# Patient Record
Sex: MALE | Race: AMERICAN INDIAN OR ALASKA NATIVE | NOT HISPANIC OR LATINO | Employment: FULL TIME | URBAN - METROPOLITAN AREA
[De-identification: names, ages, dates, MRNs, and addresses within clinical notes are randomized per-mention and may not be internally consistent; named-entity substitution may affect disease eponyms.]

---

## 2021-07-11 ENCOUNTER — HOSPITAL ENCOUNTER (EMERGENCY)
Facility: MEDICAL CENTER | Age: 41
End: 2021-07-11
Attending: EMERGENCY MEDICINE
Payer: COMMERCIAL

## 2021-07-11 VITALS
HEIGHT: 73 IN | TEMPERATURE: 96.6 F | HEART RATE: 90 BPM | WEIGHT: 300 LBS | RESPIRATION RATE: 16 BRPM | BODY MASS INDEX: 39.76 KG/M2 | DIASTOLIC BLOOD PRESSURE: 80 MMHG | SYSTOLIC BLOOD PRESSURE: 166 MMHG | OXYGEN SATURATION: 97 %

## 2021-07-11 DIAGNOSIS — F10.929 ALCOHOLIC INTOXICATION WITH COMPLICATION (HCC): ICD-10-CM

## 2021-07-11 DIAGNOSIS — E16.2 HYPOGLYCEMIA: ICD-10-CM

## 2021-07-11 LAB
ALBUMIN SERPL BCP-MCNC: 3.9 G/DL (ref 3.2–4.9)
ALBUMIN/GLOB SERPL: 1.5 G/DL
ALP SERPL-CCNC: 67 U/L (ref 30–99)
ALT SERPL-CCNC: 21 U/L (ref 2–50)
ANION GAP SERPL CALC-SCNC: 13 MMOL/L (ref 7–16)
AST SERPL-CCNC: 27 U/L (ref 12–45)
BASOPHILS # BLD AUTO: 0.3 % (ref 0–1.8)
BASOPHILS # BLD: 0.01 K/UL (ref 0–0.12)
BILIRUB SERPL-MCNC: 0.5 MG/DL (ref 0.1–1.5)
BUN SERPL-MCNC: 8 MG/DL (ref 8–22)
CALCIUM SERPL-MCNC: 8 MG/DL (ref 8.5–10.5)
CHLORIDE SERPL-SCNC: 104 MMOL/L (ref 96–112)
CO2 SERPL-SCNC: 19 MMOL/L (ref 20–33)
CREAT SERPL-MCNC: 0.38 MG/DL (ref 0.5–1.4)
EOSINOPHIL # BLD AUTO: 0 K/UL (ref 0–0.51)
EOSINOPHIL NFR BLD: 0 % (ref 0–6.9)
ERYTHROCYTE [DISTWIDTH] IN BLOOD BY AUTOMATED COUNT: 40.8 FL (ref 35.9–50)
GLOBULIN SER CALC-MCNC: 2.6 G/DL (ref 1.9–3.5)
GLUCOSE BLD-MCNC: 117 MG/DL (ref 65–99)
GLUCOSE BLD-MCNC: 96 MG/DL (ref 65–99)
GLUCOSE SERPL-MCNC: 92 MG/DL (ref 65–99)
HCT VFR BLD AUTO: 45.6 % (ref 42–52)
HGB BLD-MCNC: 15.9 G/DL (ref 14–18)
IMM GRANULOCYTES # BLD AUTO: 0.01 K/UL (ref 0–0.11)
IMM GRANULOCYTES NFR BLD AUTO: 0.3 % (ref 0–0.9)
LIPASE SERPL-CCNC: 68 U/L (ref 11–82)
LYMPHOCYTES # BLD AUTO: 0.47 K/UL (ref 1–4.8)
LYMPHOCYTES NFR BLD: 12.4 % (ref 22–41)
MCH RBC QN AUTO: 30.6 PG (ref 27–33)
MCHC RBC AUTO-ENTMCNC: 34.9 G/DL (ref 33.7–35.3)
MCV RBC AUTO: 87.9 FL (ref 81.4–97.8)
MONOCYTES # BLD AUTO: 0.2 K/UL (ref 0–0.85)
MONOCYTES NFR BLD AUTO: 5.3 % (ref 0–13.4)
NEUTROPHILS # BLD AUTO: 3.09 K/UL (ref 1.82–7.42)
NEUTROPHILS NFR BLD: 81.7 % (ref 44–72)
NRBC # BLD AUTO: 0 K/UL
NRBC BLD-RTO: 0 /100 WBC
PLATELET # BLD AUTO: 140 K/UL (ref 164–446)
PMV BLD AUTO: 9.7 FL (ref 9–12.9)
POTASSIUM SERPL-SCNC: 3.8 MMOL/L (ref 3.6–5.5)
PROT SERPL-MCNC: 6.5 G/DL (ref 6–8.2)
RBC # BLD AUTO: 5.19 M/UL (ref 4.7–6.1)
SODIUM SERPL-SCNC: 136 MMOL/L (ref 135–145)
WBC # BLD AUTO: 3.8 K/UL (ref 4.8–10.8)

## 2021-07-11 PROCEDURE — 80053 COMPREHEN METABOLIC PANEL: CPT

## 2021-07-11 PROCEDURE — 83690 ASSAY OF LIPASE: CPT

## 2021-07-11 PROCEDURE — 96365 THER/PROPH/DIAG IV INF INIT: CPT

## 2021-07-11 PROCEDURE — 700101 HCHG RX REV CODE 250: Performed by: EMERGENCY MEDICINE

## 2021-07-11 PROCEDURE — 99284 EMERGENCY DEPT VISIT MOD MDM: CPT

## 2021-07-11 PROCEDURE — 82962 GLUCOSE BLOOD TEST: CPT | Mod: 91

## 2021-07-11 PROCEDURE — 85025 COMPLETE CBC W/AUTO DIFF WBC: CPT

## 2021-07-11 RX ADMIN — THIAMINE HYDROCHLORIDE: 100 INJECTION, SOLUTION INTRAMUSCULAR; INTRAVENOUS at 08:40

## 2021-07-11 NOTE — ED TRIAGE NOTES
"Chief Complaint   Patient presents with   • Hypoglycemia     Pt bib EMS this morning after being found down on his bathroom floor, EMS states there is concern for aspiration, pt was in a puddle of his own drool/sweat, 88% on RA. Pt had a heavy night of drinking last night, doesn't remember any events after about 21:00 last night. A&Ox4 now. Pt is a diabetic, states that he took his insulin yesterday. FSBG was 54 on scene, pt was given 250mL of D10 and FSBG now 149. HTN on scene.    Pt states the productive cough he has is normal for him, tobacco smoker.     BP (!) 192/97   Pulse 70   Temp (!) 35.6 °C (96 °F) (Temporal)   Resp 16   Ht 1.854 m (6' 1\")   Wt (!) 136 kg (300 lb)   SpO2 100%   BMI 39.58 kg/m²       "

## 2021-07-11 NOTE — ED NOTES
Pt resting in [facility]. Pt and mother at bedside updated on POC. Call light in reach.

## 2021-07-11 NOTE — ED NOTES
Pt and family member at bedside verbalize understanding of discharge instructions and follow up/prescription . Pt wheeled out to ED lobby in hospital wheelchair with all belongings.

## 2021-07-11 NOTE — ED PROVIDER NOTES
ED Provider Note    Scribed for Remy Castañeda M.D. by Dilip Maier. 7/11/2021  7:46 AM    Primary care provider: Pcp Pt States None  Means of arrival: EMS  History obtained from: Patient  History limited by: None    CHIEF COMPLAINT  Chief Complaint   Patient presents with   • Hypoglycemia     Pt bib EMS this morning after being found down on his bathroom floor, EMS states there is concern for aspiration, pt was in a puddle of his own drool/sweat, 88% on RA. Pt had a heavy night of drinking last night, doesn't remember any events after about 21:00 last night. A&Ox4 now. Pt is a diabetic, states that he took his insulin yesterday. FSBG was 54 on scene, pt was given 250mL of D10 and FSBG now 149. HTN on scene.      HPI  Seven Blas is a 40 y.o. male with a history of diabetes who presents to the Emergency Department via EMS for hypoglycemia onset last night. The patient states he does not remember the events that occurred yesterday after 9PM due to alcohol intoxication last night. He states that he took his insulin yesterday. He denies missing any meals or excessive medication use. The patient denies associated vomiting. The patient has a history of smoking daily.    REVIEW OF SYSTEMS  Pertinent negatives include no vomiting. A  s above, all other systems reviewed and are negative.   See HPI for further details.     PAST MEDICAL HISTORY   has a past medical history of Asthma, Asthma, Diabetes (HCC), Hypertension, Infectious disease, and Type II or unspecified type diabetes mellitus without mention of complication, not stated as uncontrolled.    SURGICAL HISTORY   has a past surgical history that includes incision and drainage orthopedic (Left, 7/8/2015); split thickness skin graft (Left, 7/10/2015); and split thickness skin graft (Left, 7/13/2015).    SOCIAL HISTORY  Social History     Tobacco Use   • Smoking status: Current Every Day Smoker     Packs/day: 0.25     Types: Cigarettes   • Smokeless tobacco: Never  "Used   Vaping Use   • Vaping Use: Never used   Substance Use Topics   • Alcohol use: Yes     Comment: ocas.   • Drug use: No      Social History     Substance and Sexual Activity   Drug Use No       FAMILY HISTORY  History reviewed. No pertinent family history.    CURRENT MEDICATIONS  Home Medications     Reviewed by Janet Guidry R.N. (Registered Nurse) on 07/11/21 at 0733  Med List Status: Not Addressed   Medication Last Dose Status   albuterol (VENTOLIN OR PROVENTIL) 108 (90 BASE) MCG/ACT AERS inhalation aerosol  Active   albuterol 108 (90 BASE) MCG/ACT Aero Soln inhalation aerosol  Active   aspirin 81 MG tablet  Active   atorvastatin (LIPITOR) 40 MG Tab  Active   carvedilol (COREG) 3.125 MG Tab  Active   glucose blood strip  Active   hydrochlorothiazide (HYDRODIURIL) 12.5 MG tablet  Active   insulin 70/30 (HUMULIN,NOVOLIN) (70-30) 100 UNIT/ML Suspension  Active   insulin NPH (HUMULIN,NOVOLIN) 100 UNIT/ML Suspension  Active   Insulin Pen Needle 32G X 4 MM Misc  Active   INSULIN SYRINGE .5CC/29G 29G X 1/2\" 0.5 ML Misc  Active   levetiracetam (KEPPRA) 500 MG Tab  Active   levofloxacin (LEVAQUIN) 750 MG tablet  Active   linezolid (ZYVOX) 600 MG TABS  Active   lisinopril (PRINIVIL) 10 MG Tab  Active   lisinopril (PRINIVIL) 20 MG TABS  Active   NOVOLOG, insulin aspart, (NOVOLOG) 100 UNIT/ML Solution Pen-injector injection  Active   ONETOUCH DELICA LANCETS FINE Misc  Active   oxycodone immediate-release (ROXICODONE) 5 MG TABS  Active   senna-docusate (PERICOLACE OR SENOKOT S) 8.6-50 MG TABS  Active                ALLERGIES  No Known Allergies    PHYSICAL EXAM  VITAL SIGNS: BP (!) 192/97   Pulse 70   Temp (!) 35.6 °C (96 °F) (Temporal)   Resp 16   Ht 1.854 m (6' 1\")   Wt (!) 136 kg (300 lb)   SpO2 100%   BMI 39.58 kg/m²   Constitutional: Well developed, Well nourished, No acute distress, Non-toxic appearance.   HENT: Normocephalic, Atraumatic, Bilateral external ears normal, Oropharynx is clear mucous " membranes are moist. No oral exudates or nasal discharge.   Eyes: Pupils are equal round and reactive, EOMI, Conjunctiva normal, No discharge.   Neck: Normal range of motion, No tenderness, Supple, No stridor. No meningismus.  Lymphatic: No lymphadenopathy noted.   Cardiovascular: Regular rate and rhythm without murmur rub or gallop.  Thorax & Lungs: Clear breath sounds bilaterally without wheezes, rhonchi or rales. There is no chest wall tenderness.   Abdomen: Soft non-tender non-distended. There is no rebound or guarding. No organomegaly is appreciated. Bowel sounds are normal.  Skin: Normal without rash.   Back: No CVA or spinal tenderness.   Extremities: Intact distal pulses, No edema, No tenderness, No cyanosis, No clubbing. Capillary refill 3 seconds.  Musculoskeletal: Good range of motion in all major joints. No tenderness to palpation or major deformities noted.   Neurologic: Alert & oriented x 3, Normal motor function, Normal sensory function, No focal deficits noted. Reflexes are normal.  Psychiatric: Affect normal, Judgment normal, Mood normal. There is no suicidal ideation or patient reported hallucinations.     DIAGNOSTIC STUDIES / PROCEDURES    LABS  Labs Reviewed   CBC WITH DIFFERENTIAL - Abnormal; Notable for the following components:       Result Value    WBC 3.8 (*)     Platelet Count 140 (*)     Neutrophils-Polys 81.70 (*)     Lymphocytes 12.40 (*)     Lymphs (Absolute) 0.47 (*)     All other components within normal limits   COMP METABOLIC PANEL - Abnormal; Notable for the following components:    Co2 19 (*)     Creatinine 0.38 (*)     Calcium 8.0 (*)     All other components within normal limits   LIPASE   ESTIMATED GFR   POCT GLUCOSE DEVICE RESULTS      All labs reviewed by me.    COURSE & MEDICAL DECISION MAKING  Nursing notes, VS, PMSFHx reviewed in chart.    7:46 AM Patient seen and examined at bedside. Ordered for basic lab panel to evaluate. Patient was treated with detox IV 1000mL for his  symptoms.      Laboratory evaluation reveals mild leukopenia, minimal shift, no electrolyte derangements and mildly low bicarb at 19.  No evidence of pancreatitis.    9:52 AM - I reevaluated the patient at bedside who reports feeling improved. I discussed plan for discharge and follow up as outlined below. The patient verbalizes they feel comfortable going home. The patient is stable for discharge at this time and will return for any new or worsening symptoms. Patient verbalizes understanding and support with my plan for discharge.     Patient has had high blood pressure while in the emergency department, felt likely secondary to medical condition. Counseled patient to monitor blood pressure at home and follow up with primary care physician.      HYDRATION: Based on the patient's presentation of alcohol intoxication the patient was given IV fluids. IV Hydration was used because oral hydration was not adequate alone. Upon recheck following hydration, the patient was improved..    DISPOSITION:  Patient will be discharged home in stable condition.  Patient instructed on minimizing alcohol intake in the future to avoid hypoglycemic episodes.    FINAL IMPRESSION  1. Alcoholic intoxication with complication (HCC)    2. Hypoglycemia          Dilip FULTON (Rosendo), am scribing for, and in the presence of, Remy Castañeda M.D..    Electronically signed by: Dilip Maier ([place]), 7/11/2021    Remy FULTON M.D. personally performed the services described in this documentation, as scribed by Dilip Maier in my presence, and it is both accurate and complete.    The note accurately reflects work and decisions made by me.  Remy Castañeda M.D.  7/11/2021  9:54 AM

## 2023-12-04 ENCOUNTER — TELEPHONE (OUTPATIENT)
Dept: HEALTH INFORMATION MANAGEMENT | Facility: OTHER | Age: 43
End: 2023-12-04

## 2024-11-11 ENCOUNTER — OFFICE VISIT (OUTPATIENT)
Dept: CARDIOLOGY | Facility: MEDICAL CENTER | Age: 44
End: 2024-11-11
Attending: INTERNAL MEDICINE
Payer: MEDICAID

## 2024-11-11 VITALS
OXYGEN SATURATION: 99 % | DIASTOLIC BLOOD PRESSURE: 66 MMHG | SYSTOLIC BLOOD PRESSURE: 124 MMHG | RESPIRATION RATE: 17 BRPM | HEART RATE: 86 BPM | BODY MASS INDEX: 39.76 KG/M2 | WEIGHT: 300 LBS | HEIGHT: 73 IN

## 2024-11-11 DIAGNOSIS — I10 HTN (HYPERTENSION), MALIGNANT: ICD-10-CM

## 2024-11-11 DIAGNOSIS — E11.65 TYPE 2 DIABETES MELLITUS WITH HYPERGLYCEMIA, WITH LONG-TERM CURRENT USE OF INSULIN (HCC): ICD-10-CM

## 2024-11-11 DIAGNOSIS — Z79.4 TYPE 2 DIABETES MELLITUS WITH HYPERGLYCEMIA, WITH LONG-TERM CURRENT USE OF INSULIN (HCC): ICD-10-CM

## 2024-11-11 DIAGNOSIS — I51.7 LVH (LEFT VENTRICULAR HYPERTROPHY): ICD-10-CM

## 2024-11-11 DIAGNOSIS — E78.2 MIXED HYPERLIPIDEMIA: ICD-10-CM

## 2024-11-11 DIAGNOSIS — R42 EPISODIC LIGHTHEADEDNESS: ICD-10-CM

## 2024-11-11 LAB — EKG IMPRESSION: NORMAL

## 2024-11-11 PROCEDURE — 3078F DIAST BP <80 MM HG: CPT | Performed by: INTERNAL MEDICINE

## 2024-11-11 PROCEDURE — 3074F SYST BP LT 130 MM HG: CPT | Performed by: INTERNAL MEDICINE

## 2024-11-11 PROCEDURE — 93010 ELECTROCARDIOGRAM REPORT: CPT | Performed by: INTERNAL MEDICINE

## 2024-11-11 PROCEDURE — 93005 ELECTROCARDIOGRAM TRACING: CPT | Performed by: INTERNAL MEDICINE

## 2024-11-11 PROCEDURE — 99214 OFFICE O/P EST MOD 30 MIN: CPT | Performed by: INTERNAL MEDICINE

## 2024-11-11 PROCEDURE — 99213 OFFICE O/P EST LOW 20 MIN: CPT | Performed by: INTERNAL MEDICINE

## 2024-11-11 RX ORDER — CARVEDILOL 6.25 MG/1
6.25 TABLET ORAL 2 TIMES DAILY WITH MEALS
Qty: 180 TABLET | Refills: 4 | Status: SHIPPED | OUTPATIENT
Start: 2024-11-11

## 2024-11-11 ASSESSMENT — ENCOUNTER SYMPTOMS
COUGH: 0
ORTHOPNEA: 0
HEADACHES: 0
SPEECH CHANGE: 0
BLOOD IN STOOL: 0
WEIGHT LOSS: 0
MYALGIAS: 0
CLAUDICATION: 0
LOSS OF CONSCIOUSNESS: 0
CHILLS: 0
PALPITATIONS: 0
BLURRED VISION: 0
HALLUCINATIONS: 0
VOMITING: 0
PND: 0
DIZZINESS: 1
FALLS: 0
FEVER: 0
BRUISES/BLEEDS EASILY: 0
SHORTNESS OF BREATH: 0
NAUSEA: 0
EYE PAIN: 0
SENSORY CHANGE: 0
ABDOMINAL PAIN: 0
DEPRESSION: 0
EYE DISCHARGE: 0
DOUBLE VISION: 0

## 2024-11-11 NOTE — PROGRESS NOTES
Chief Complaint   Patient presents with    Hyperlipidemia     NP  F/V DX: Hyperlipidemia       Subjective     Tu Blas is a 44 y.o. male who presents today for report of lightheadedness while doing yard work. NO chest pain, dyspnea. He does have DM2, HTN, hyperlipidemia.    I have personally interpreted EKG today with patient, there is no evidence of acute coronary syndrome, no evidence of prior infarct, normal IA and QT interval, no significant conduction disease. Sinus rhythm with LVH.    Seven Blas does not have any history of heart attack arrhythmias in the past. he never had cardiac catheterization or ablations procedure in the past.    Father had heart disease.    Past Medical History:   Diagnosis Date    Asthma     Asthma     uses albuterol inh    Diabetes (HCC)     Hypertension     Infectious disease     oropharyngeal herpes    Type II or unspecified type diabetes mellitus without mention of complication, not stated as uncontrolled     USED TO TAKE INSULIN     Past Surgical History:   Procedure Laterality Date    SPLIT THICKNESS SKIN GRAFT Left 7/13/2015    Procedure: SPLIT THICKNESS SKIN GRAFT LEG WITH WOUND VAC CHANGE;  Surgeon: Tahir Blackwell M.D.;  Location: SURGERY [facility];  Service:     SPLIT THICKNESS SKIN GRAFT Left 7/10/2015    Procedure: SPLIT THICKNESS SKIN GRAFT LEG WITH WOUND VAC CHANGE;  Surgeon: Tahir Blackwell M.D.;  Location: SURGERY [facility];  Service:     INCISION AND DRAINAGE ORTHOPEDIC Left 7/8/2015    Procedure: INCISION AND DRAINAGE ORTHOPEDIC;  Surgeon: Tahir Blackwell M.D.;  Location: SURGERY [facility];  Service:      History reviewed. No pertinent family history.  Social History     Socioeconomic History    Marital status: Single     Spouse name: Not on file    Number of children: Not on file    Years of education: Not on file    Highest education level: Not on file   Occupational History    Not on file   Tobacco Use    Smoking  "status: Every Day     Current packs/day: 0.25     Types: Cigarettes    Smokeless tobacco: Never   Vaping Use    Vaping status: Never Used   Substance and Sexual Activity    Alcohol use: Yes     Comment: ocas.    Drug use: No    Sexual activity: Not on file   Other Topics Concern    Not on file   Social History Narrative    ** Merged History Encounter **          Social Drivers of Health     Financial Resource Strain: Not on file   Food Insecurity: Not on file   Transportation Needs: Not on file   Physical Activity: Not on file   Stress: Not on file   Social Connections: Not on file   Intimate Partner Violence: Not on file   Housing Stability: Not on file     No Known Allergies  Outpatient Encounter Medications as of 11/11/2024   Medication Sig Dispense Refill    carvedilol (COREG) 6.25 MG Tab Take 1 Tablet by mouth 2 times a day with meals. 180 Tablet 4    aspirin 81 MG tablet Take 1 Tab by mouth every day.      albuterol 108 (90 BASE) MCG/ACT Aero Soln inhalation aerosol Inhale 2 Puffs by mouth every 6 hours as needed for Shortness of Breath. 8.5 g 3    insulin NPH (HUMULIN,NOVOLIN) 100 UNIT/ML Suspension Inject 30 Units as instructed 2 Times a Day. 10 mL 3    insulin 70/30 (HUMULIN,NOVOLIN) (70-30) 100 UNIT/ML Suspension Inject 30 Units as instructed 2 Times a Day. 10 mL 3    atorvastatin (LIPITOR) 40 MG Tab Take 1 Tab by mouth every bedtime. 30 Tab 2    lisinopril (PRINIVIL) 10 MG Tab Take 1 Tab by mouth every day. 30 Tab 2    NOVOLOG, insulin aspart, (NOVOLOG) 100 UNIT/ML Solution Pen-injector injection Inject 7 Units as instructed 3 times a day before meals. 10 mL 3    levetiracetam (KEPPRA) 500 MG Tab Take 1 Tab by mouth 2 times a day. 60 Tab 3    ONETOUCH DELICA LANCETS FINE Misc 1 Each by Does not apply route 3 times a day before meals. 100 Each 3    INSULIN SYRINGE .5CC/29G 29G X 1/2\" 0.5 ML Misc 1 Each by Does not apply route 3 times a day before meals. 100 Each 3    glucose blood strip 1 Strip by Other " route as needed. 100 Strip 3    Insulin Pen Needle 32G X 4 MM Misc 1 Each by Does not apply route 3 times a day before meals. 100 Each 3    oxycodone immediate-release (ROXICODONE) 5 MG TABS Take 1-2 Tabs by mouth every four hours as needed for Severe Pain. 45 Tab 0    lisinopril (PRINIVIL) 20 MG TABS Take 1 Tab by mouth every day. 30 Tab 0    hydrochlorothiazide (HYDRODIURIL) 12.5 MG tablet Take 1 Tab by mouth every day. 30 Tab 0    levofloxacin (LEVAQUIN) 750 MG tablet Take 1 Tab by mouth every day. 10 Tab 0    senna-docusate (PERICOLACE OR SENOKOT S) 8.6-50 MG TABS Take 2 Tabs by mouth every day. 60 Tab 0    linezolid (ZYVOX) 600 MG TABS Take 1 Tab by mouth every 12 hours. 20 Tab 0    albuterol (VENTOLIN OR PROVENTIL) 108 (90 BASE) MCG/ACT AERS inhalation aerosol Inhale 2 Puffs by mouth every four hours as needed for Shortness of Breath. Indications: Asthma      [DISCONTINUED] carvedilol (COREG) 3.125 MG Tab Take 1 Tab by mouth 2 times a day, with meals. 60 Tab 2     No facility-administered encounter medications on file as of 11/11/2024.     Review of Systems   Constitutional:  Negative for chills, fever, malaise/fatigue and weight loss.   HENT:  Negative for ear discharge, ear pain, hearing loss and nosebleeds.    Eyes:  Negative for blurred vision, double vision, pain and discharge.   Respiratory:  Negative for cough and shortness of breath.    Cardiovascular:  Negative for chest pain, palpitations, orthopnea, claudication, leg swelling and PND.   Gastrointestinal:  Negative for abdominal pain, blood in stool, melena, nausea and vomiting.   Genitourinary:  Negative for dysuria and hematuria.   Musculoskeletal:  Negative for falls, joint pain and myalgias.   Skin:  Negative for itching and rash.   Neurological:  Positive for dizziness. Negative for sensory change, speech change, loss of consciousness and headaches.   Endo/Heme/Allergies:  Negative for environmental allergies. Does not bruise/bleed easily.  "  Psychiatric/Behavioral:  Negative for depression, hallucinations and suicidal ideas.               Objective     /66 (BP Location: Left arm, Patient Position: Sitting, BP Cuff Size: Adult)   Pulse 86   Resp 17   Ht 1.854 m (6' 1\")   Wt (!) 136 kg (300 lb)   SpO2 99%   BMI 39.58 kg/m²     Physical Exam  Vitals and nursing note reviewed.   Constitutional:       General: He is not in acute distress.     Appearance: He is not diaphoretic.   HENT:      Head: Normocephalic and atraumatic.      Right Ear: External ear normal.      Left Ear: External ear normal.      Nose: No congestion or rhinorrhea.   Eyes:      General:         Right eye: No discharge.         Left eye: No discharge.   Neck:      Thyroid: No thyromegaly.      Vascular: No JVD.   Cardiovascular:      Rate and Rhythm: Normal rate and regular rhythm.      Pulses: Normal pulses.   Pulmonary:      Effort: No respiratory distress.   Abdominal:      General: There is no distension.      Tenderness: There is no abdominal tenderness.   Musculoskeletal:         General: No swelling or tenderness.      Right lower leg: No edema.      Left lower leg: No edema.   Skin:     General: Skin is warm and dry.   Neurological:      Mental Status: He is alert and oriented to person, place, and time.      Cranial Nerves: No cranial nerve deficit.   Psychiatric:         Behavior: Behavior normal.                Assessment & Plan     1. Episodic lightheadedness  EC-ECHOCARDIOGRAM COMPLETE W/O CONT    Cardiac Event Monitor    NM-CARDIAC TREADMILL ONLY-NO IMAGING      2. HTN (hypertension), malignant  carvedilol (COREG) 6.25 MG Tab      3. Mixed hyperlipidemia  EKG    Basic Metabolic Panel    LIPID PANEL      4. Type 2 diabetes mellitus with hyperglycemia, with long-term current use of insulin (HCC)  HEMOGLOBIN A1C      5. LVH (left ventricular hypertrophy)  EC-ECHOCARDIOGRAM COMPLETE W/O CONT          Medical Decision Making: Today's Assessment/Status/Plan:   At " this time, to further risk stratify, I will order a transthoracic echocardiogram to assess cardiac and valvular functions. I will also order a treadmill stress test (to avoid radiation exposure in young patients) to assess for coronary ischemia.  I will also obtain home long-term event monitoring with zio patch.    Blood pressure is well controlled Continue HCTZ 12.5 mg daily, Lisinopril 20 mg daily.    Will increase Carvedilol to 6.25 mg po twice daily.    Continue Atorvastatin 40 mg daily.    This visit encounter signifies the visit complexity inherent to evaluation and management associated with medical care services that serve as the continuing focal point for all needed health care services and/or with medical care services that are part of ongoing care related to this patient's single, serious condition, complex cardiac condition.    Radha Odell M.D.

## 2024-11-15 LAB
CHOLEST SERPL-MCNC: 127 MG/DL
HDLC SERPL-MCNC: 52 MG/DL
LDLC SERPL CALC-MCNC: 57 MG/DL
TRIGL SERPL-MCNC: 98 MG/DL

## 2024-11-26 ENCOUNTER — HOSPITAL ENCOUNTER (OUTPATIENT)
Dept: RADIOLOGY | Facility: MEDICAL CENTER | Age: 44
End: 2024-11-26
Attending: INTERNAL MEDICINE
Payer: MEDICAID

## 2024-11-26 DIAGNOSIS — R42 EPISODIC LIGHTHEADEDNESS: ICD-10-CM

## 2024-11-26 PROCEDURE — 93017 CV STRESS TEST TRACING ONLY: CPT

## 2024-11-26 NOTE — PROGRESS NOTES
Home enrollment completed in the 14 day Zio Holter monitor per Bartolome Odell MD. Monitor will be shipped to patient via [employer], pending EOS.

## 2024-11-29 ENCOUNTER — HOSPITAL ENCOUNTER (OUTPATIENT)
Dept: CARDIOLOGY | Facility: MEDICAL CENTER | Age: 44
End: 2024-11-29
Attending: INTERNAL MEDICINE
Payer: MEDICAID

## 2024-11-29 DIAGNOSIS — R42 EPISODIC LIGHTHEADEDNESS: ICD-10-CM

## 2024-11-29 DIAGNOSIS — I51.7 LVH (LEFT VENTRICULAR HYPERTROPHY): ICD-10-CM

## 2024-11-29 PROCEDURE — 93306 TTE W/DOPPLER COMPLETE: CPT

## 2024-11-30 LAB
LV EJECT FRACT  99904: 60
LV EJECT FRACT MOD 2C 99903: 64.15
LV EJECT FRACT MOD 4C 99902: 56.7
LV EJECT FRACT MOD BP 99901: 60.29

## 2024-12-02 ENCOUNTER — NON-PROVIDER VISIT (OUTPATIENT)
Dept: CARDIOLOGY | Facility: MEDICAL CENTER | Age: 44
End: 2024-12-02
Attending: INTERNAL MEDICINE
Payer: MEDICAID

## 2024-12-02 DIAGNOSIS — R42 EPISODIC LIGHTHEADEDNESS: ICD-10-CM

## 2024-12-26 ENCOUNTER — TELEPHONE (OUTPATIENT)
Dept: CARDIOLOGY | Facility: MEDICAL CENTER | Age: 44
End: 2024-12-26
Payer: MEDICAID

## 2024-12-26 NOTE — TELEPHONE ENCOUNTER
Terry EOS to TT's nurse, Tanisha, on 12/26/2024    Preliminary findings:    1 episode of -130 with an avg rate of 121 bpm    Sinus Rhythm  with an avg rate of 71 bpm  Intermittent BBB     Supraventricular and ventricular ectopics were rare with no noted triplets    Ventricular bigeminy was noted    No recorded patient events

## 2025-02-13 ENCOUNTER — OFFICE VISIT (OUTPATIENT)
Dept: CARDIOLOGY | Facility: MEDICAL CENTER | Age: 45
End: 2025-02-13
Attending: INTERNAL MEDICINE
Payer: MEDICAID

## 2025-02-13 ENCOUNTER — TELEPHONE (OUTPATIENT)
Dept: CARDIOLOGY | Facility: MEDICAL CENTER | Age: 45
End: 2025-02-13
Payer: MEDICAID

## 2025-02-13 VITALS
HEIGHT: 73 IN | OXYGEN SATURATION: 95 % | BODY MASS INDEX: 37.77 KG/M2 | WEIGHT: 285 LBS | SYSTOLIC BLOOD PRESSURE: 128 MMHG | DIASTOLIC BLOOD PRESSURE: 70 MMHG | HEART RATE: 68 BPM

## 2025-02-13 DIAGNOSIS — Z79.4 TYPE 2 DIABETES MELLITUS WITH HYPERGLYCEMIA, WITH LONG-TERM CURRENT USE OF INSULIN (HCC): ICD-10-CM

## 2025-02-13 DIAGNOSIS — I51.7 LVH (LEFT VENTRICULAR HYPERTROPHY): ICD-10-CM

## 2025-02-13 DIAGNOSIS — I49.3 PVC'S (PREMATURE VENTRICULAR CONTRACTIONS): ICD-10-CM

## 2025-02-13 DIAGNOSIS — E78.2 MIXED HYPERLIPIDEMIA: ICD-10-CM

## 2025-02-13 DIAGNOSIS — I35.1 MODERATE AORTIC REGURGITATION: ICD-10-CM

## 2025-02-13 DIAGNOSIS — I10 HTN (HYPERTENSION), MALIGNANT: ICD-10-CM

## 2025-02-13 DIAGNOSIS — I49.1 APC (ATRIAL PREMATURE CONTRACTIONS): ICD-10-CM

## 2025-02-13 DIAGNOSIS — E11.65 TYPE 2 DIABETES MELLITUS WITH HYPERGLYCEMIA, WITH LONG-TERM CURRENT USE OF INSULIN (HCC): ICD-10-CM

## 2025-02-13 PROCEDURE — 99215 OFFICE O/P EST HI 40 MIN: CPT | Performed by: INTERNAL MEDICINE

## 2025-02-13 PROCEDURE — 3078F DIAST BP <80 MM HG: CPT | Performed by: INTERNAL MEDICINE

## 2025-02-13 PROCEDURE — 99213 OFFICE O/P EST LOW 20 MIN: CPT | Performed by: INTERNAL MEDICINE

## 2025-02-13 PROCEDURE — 3074F SYST BP LT 130 MM HG: CPT | Performed by: INTERNAL MEDICINE

## 2025-02-13 RX ORDER — CARVEDILOL 12.5 MG/1
12.5 TABLET ORAL 2 TIMES DAILY WITH MEALS
Qty: 180 TABLET | Refills: 4 | Status: SHIPPED | OUTPATIENT
Start: 2025-02-13

## 2025-02-13 ASSESSMENT — ENCOUNTER SYMPTOMS
HALLUCINATIONS: 0
BRUISES/BLEEDS EASILY: 0
PALPITATIONS: 0
PND: 0
EYE DISCHARGE: 0
SPEECH CHANGE: 0
CHILLS: 0
HEADACHES: 0
BLURRED VISION: 0
COUGH: 0
ORTHOPNEA: 0
ABDOMINAL PAIN: 0
MYALGIAS: 0
BLOOD IN STOOL: 0
DEPRESSION: 0
SHORTNESS OF BREATH: 0
FALLS: 0
LOSS OF CONSCIOUSNESS: 0
SENSORY CHANGE: 0
CLAUDICATION: 0
WEIGHT LOSS: 0
FEVER: 0
EYE PAIN: 0
VOMITING: 0
DOUBLE VISION: 0
NAUSEA: 0
DIZZINESS: 1

## 2025-02-13 NOTE — PROGRESS NOTES
Chief Complaint   Patient presents with    Hypertension    MI (Non ST Segment Elevation MI)       Subjective     Tu Blas is a 44 y.o. male who presents today for report of lightheadedness while doing yard work. NO chest pain, dyspnea. He does have DM2, HTN, hyperlipidemia.    I have personally interpreted EKG today with patient, there is no evidence of acute coronary syndrome, no evidence of prior infarct, normal AR and QT interval, no significant conduction disease. Sinus rhythm with LVH.    Seven Blas does not have any history of heart attack arrhythmias in the past. he never had cardiac catheterization or ablations procedure in the past.    Father had heart disease.    I have independently interpreted and reviewed blood tests results with patient in clinic today which showed LDL level of 57, triglycerides level of 98, GFR of normal, K of 3.8.    I have independently interpreted and reviewed echocardiogram's actual images with patient which showed normal left ventricular systolic function. No wall motion abnormality. No evidence of pulmonary hypertension. Moderate AR and ???bicuspid aortic valve.    I have independently interpreted and reviewed treadmill stress test's actual images and EKG tracing with patient which showed normal left ventricular systolic function. No coronary ischemia.    I personally interpreted and reviewed the tracings of event monitor with patient in clinic today which showed no significant events of sinus pause, no significant arrhythmias. no significant AV block, rare PACs and rare PVCs, PSVT (not meeting criteria for diagnosis of atrial fibrillation or atrial flutter).      Past Medical History:   Diagnosis Date    Asthma     Asthma     uses albuterol inh    Diabetes (HCC)     Hypertension     Infectious disease     oropharyngeal herpes    Type II or unspecified type diabetes mellitus without mention of complication, not stated as uncontrolled     USED TO TAKE INSULIN      Past Surgical History:   Procedure Laterality Date    SPLIT THICKNESS SKIN GRAFT Left 7/13/2015    Procedure: SPLIT THICKNESS SKIN GRAFT LEG WITH WOUND VAC CHANGE;  Surgeon: Tahir Blackwell M.D.;  Location: SURGERY [facility];  Service:     SPLIT THICKNESS SKIN GRAFT Left 7/10/2015    Procedure: SPLIT THICKNESS SKIN GRAFT LEG WITH WOUND VAC CHANGE;  Surgeon: Tahir Blackwell M.D.;  Location: SURGERY [facility];  Service:     INCISION AND DRAINAGE ORTHOPEDIC Left 7/8/2015    Procedure: INCISION AND DRAINAGE ORTHOPEDIC;  Surgeon: Tahir Blackwell M.D.;  Location: SURGERY [facility];  Service:      No family history on file.  Social History     Socioeconomic History    Marital status: Single     Spouse name: Not on file    Number of children: Not on file    Years of education: Not on file    Highest education level: Not on file   Occupational History    Not on file   Tobacco Use    Smoking status: Every Day     Current packs/day: 0.25     Types: Cigarettes    Smokeless tobacco: Never   Vaping Use    Vaping status: Never Used   Substance and Sexual Activity    Alcohol use: Yes     Comment: ocas.    Drug use: No    Sexual activity: Not on file   Other Topics Concern    Not on file   Social History Narrative    ** Merged History Encounter **          Social Drivers of Health     Financial Resource Strain: Not on file   Food Insecurity: Not on file   Transportation Needs: Not on file   Physical Activity: Not on file   Stress: Not on file   Social Connections: Not on file   Intimate Partner Violence: Not on file   Housing Stability: Not on file     No Known Allergies  Outpatient Encounter Medications as of 2/13/2025   Medication Sig Dispense Refill    carvedilol (COREG) 12.5 MG Tab Take 1 Tablet by mouth 2 times a day with meals. 180 Tablet 4    aspirin 81 MG tablet Take 1 Tab by mouth every day.      albuterol 108 (90 BASE) MCG/ACT Aero Soln inhalation aerosol Inhale 2 Puffs by mouth every 6 hours  "as needed for Shortness of Breath. 8.5 g 3    insulin NPH (HUMULIN,NOVOLIN) 100 UNIT/ML Suspension Inject 30 Units as instructed 2 Times a Day. 10 mL 3    insulin 70/30 (HUMULIN,NOVOLIN) (70-30) 100 UNIT/ML Suspension Inject 30 Units as instructed 2 Times a Day. 10 mL 3    atorvastatin (LIPITOR) 40 MG Tab Take 1 Tab by mouth every bedtime. 30 Tab 2    lisinopril (PRINIVIL) 10 MG Tab Take 1 Tab by mouth every day. 30 Tab 2    ONETOUCH DELICA LANCETS FINE Misc 1 Each by Does not apply route 3 times a day before meals. 100 Each 3    INSULIN SYRINGE .5CC/29G 29G X 1/2\" 0.5 ML Misc 1 Each by Does not apply route 3 times a day before meals. 100 Each 3    glucose blood strip 1 Strip by Other route as needed. 100 Strip 3    Insulin Pen Needle 32G X 4 MM Misc 1 Each by Does not apply route 3 times a day before meals. 100 Each 3    lisinopril (PRINIVIL) 20 MG TABS Take 1 Tab by mouth every day. 30 Tab 0    albuterol (VENTOLIN OR PROVENTIL) 108 (90 BASE) MCG/ACT AERS inhalation aerosol Inhale 2 Puffs by mouth every four hours as needed for Shortness of Breath. Indications: Asthma      [DISCONTINUED] carvedilol (COREG) 6.25 MG Tab Take 1 Tablet by mouth 2 times a day with meals. 180 Tablet 4    [DISCONTINUED] NOVOLOG, insulin aspart, (NOVOLOG) 100 UNIT/ML Solution Pen-injector injection Inject 7 Units as instructed 3 times a day before meals. (Patient not taking: Reported on 2/13/2025) 10 mL 3    [DISCONTINUED] levetiracetam (KEPPRA) 500 MG Tab Take 1 Tab by mouth 2 times a day. (Patient not taking: Reported on 2/13/2025) 60 Tab 3    [DISCONTINUED] oxycodone immediate-release (ROXICODONE) 5 MG TABS Take 1-2 Tabs by mouth every four hours as needed for Severe Pain. (Patient not taking: Reported on 2/13/2025) 45 Tab 0    [DISCONTINUED] hydrochlorothiazide (HYDRODIURIL) 12.5 MG tablet Take 1 Tab by mouth every day. (Patient not taking: Reported on 2/13/2025) 30 Tab 0    [DISCONTINUED] levofloxacin (LEVAQUIN) 750 MG tablet Take 1 " "Tab by mouth every day. (Patient not taking: Reported on 2/13/2025) 10 Tab 0    [DISCONTINUED] senna-docusate (PERICOLACE OR SENOKOT S) 8.6-50 MG TABS Take 2 Tabs by mouth every day. (Patient not taking: Reported on 2/13/2025) 60 Tab 0    [DISCONTINUED] linezolid (ZYVOX) 600 MG TABS Take 1 Tab by mouth every 12 hours. (Patient not taking: Reported on 2/13/2025) 20 Tab 0     No facility-administered encounter medications on file as of 2/13/2025.     Review of Systems   Constitutional:  Negative for chills, fever, malaise/fatigue and weight loss.   HENT:  Negative for ear discharge, ear pain, hearing loss and nosebleeds.    Eyes:  Negative for blurred vision, double vision, pain and discharge.   Respiratory:  Negative for cough and shortness of breath.    Cardiovascular:  Negative for chest pain, palpitations, orthopnea, claudication, leg swelling and PND.   Gastrointestinal:  Negative for abdominal pain, blood in stool, melena, nausea and vomiting.   Genitourinary:  Negative for dysuria and hematuria.   Musculoskeletal:  Negative for falls, joint pain and myalgias.   Skin:  Negative for itching and rash.   Neurological:  Positive for dizziness. Negative for sensory change, speech change, loss of consciousness and headaches.   Endo/Heme/Allergies:  Negative for environmental allergies. Does not bruise/bleed easily.   Psychiatric/Behavioral:  Negative for depression, hallucinations and suicidal ideas.               Objective     /70 (BP Location: Left arm, Patient Position: Sitting, BP Cuff Size: Adult)   Pulse 68   Ht 1.854 m (6' 1\")   Wt (!) 129 kg (285 lb)   SpO2 95%   BMI 37.60 kg/m²     Physical Exam  Vitals and nursing note reviewed.   Constitutional:       General: He is not in acute distress.     Appearance: He is not diaphoretic.   HENT:      Head: Normocephalic and atraumatic.      Right Ear: External ear normal.      Left Ear: External ear normal.      Nose: No congestion or rhinorrhea.   Eyes:    "   General:         Right eye: No discharge.         Left eye: No discharge.   Neck:      Thyroid: No thyromegaly.      Vascular: No JVD.   Cardiovascular:      Rate and Rhythm: Normal rate and regular rhythm.      Pulses: Normal pulses.   Pulmonary:      Effort: No respiratory distress.   Abdominal:      General: There is no distension.      Tenderness: There is no abdominal tenderness.   Musculoskeletal:         General: No swelling or tenderness.      Right lower leg: No edema.      Left lower leg: No edema.   Skin:     General: Skin is warm and dry.   Neurological:      Mental Status: He is alert and oriented to person, place, and time.      Cranial Nerves: No cranial nerve deficit.   Psychiatric:         Behavior: Behavior normal.                Assessment & Plan     1. PVC's (premature ventricular contractions)  carvedilol (COREG) 12.5 MG Tab      2. APC (atrial premature contractions)  carvedilol (COREG) 12.5 MG Tab      3. LVH (left ventricular hypertrophy)        4. HTN (hypertension), malignant  carvedilol (COREG) 12.5 MG Tab      5. Mixed hyperlipidemia        6. Type 2 diabetes mellitus with hyperglycemia, with long-term current use of insulin (HCC)        7. Moderate aortic regurgitation  EC-REY W/O CONT            Medical Decision Making: Today's Assessment/Status/Plan:   Palpitations are likely related to PACs and PVCs.  I advised patient on reduction of caffeine, ETOH intake along with adequate electrolytes hydration.  Optimize exercise program.  Will increase Carvedilol to 12.5 mg po twice daily.    In terms of moderate AR, we do need to study the valve in more details.  Risks and benefits of transesophageal echocardiogram were explained to patient.  Patient showed good understanding.  Risks including esophageal perforation, death, bleeding, infection were clearly conveyed to patient.  Patient is willing to accept the risks and proceed with procedure.    Blood pressure is well controlled Continue  HCTZ 12.5 mg daily, Lisinopril 20 mg daily.    Will increase Carvedilol to 12.5 mg po twice daily.    Continue Atorvastatin 40 mg daily for LDL control.    This visit encounter signifies the visit complexity inherent to evaluation and management associated with medical care services that serve as the continuing focal point for all needed health care services and/or with medical care services that are part of ongoing care related to this patient's single, serious condition, complex cardiac condition.    Radha Odell M.D.                        no

## 2025-02-19 NOTE — TELEPHONE ENCOUNTER
Left message with patient's mom to call back and get scheduled.    FAMILY HISTORY:  Father  Still living? Unknown  FH: MI (myocardial infarction), Age at diagnosis: Age Unknown    Mother  Still living? Unknown  FH: MI (myocardial infarction), Age at diagnosis: Age Unknown

## 2025-02-20 NOTE — TELEPHONE ENCOUNTER
Patient is scheduled for a REY without anesthesia on 03- with Dr. Odell. Patient   has been instructed to check in at 11:00 am for 1:00 procedure. Instructed to take 1/2 a dose of insulin the morning of. Patient has been advised they will need a  home and with them after since they are sedated. Message sent to authorizations. Sent email to pt with instructions. VIOLETA sent to To

## 2025-02-24 ENCOUNTER — APPOINTMENT (OUTPATIENT)
Dept: ADMISSIONS | Facility: MEDICAL CENTER | Age: 45
End: 2025-02-24
Attending: INTERNAL MEDICINE
Payer: MEDICAID

## 2025-02-28 ENCOUNTER — PRE-ADMISSION TESTING (OUTPATIENT)
Dept: ADMISSIONS | Facility: MEDICAL CENTER | Age: 45
End: 2025-02-28
Attending: INTERNAL MEDICINE
Payer: MEDICAID

## 2025-02-28 NOTE — OR NURSING
RN tele pre admit appointment completed with patient:  Patient provided medication and fasting instructions per Cardiology Procedure Instruction Sheet and bathing instructions per Preparing for Your Procedure packet.  Procedure date 3/4/2025.    During pre admit appointment this RN encouraged patient to increase fluid intake the day prior to surgery including intake of electrolyte drinks such as Gatorade or electrolyte water and patient may have up to 16 ounces of clear liquids until 2 hours prior to procedure.     Patient verbalizes understanding of all instructions given. No further questions at this time.

## 2025-03-04 ENCOUNTER — ANESTHESIA EVENT (OUTPATIENT)
Dept: CARDIOLOGY | Facility: MEDICAL CENTER | Age: 45
End: 2025-03-04
Payer: MEDICAID

## 2025-03-04 ENCOUNTER — APPOINTMENT (OUTPATIENT)
Dept: CARDIOLOGY | Facility: MEDICAL CENTER | Age: 45
End: 2025-03-04
Attending: INTERNAL MEDICINE
Payer: MEDICAID

## 2025-03-04 ENCOUNTER — HOSPITAL ENCOUNTER (OUTPATIENT)
Facility: MEDICAL CENTER | Age: 45
End: 2025-03-04
Attending: INTERNAL MEDICINE | Admitting: INTERNAL MEDICINE
Payer: MEDICAID

## 2025-03-04 ENCOUNTER — ANESTHESIA (OUTPATIENT)
Dept: CARDIOLOGY | Facility: MEDICAL CENTER | Age: 45
End: 2025-03-04
Payer: MEDICAID

## 2025-03-04 VITALS
TEMPERATURE: 97.2 F | HEIGHT: 73 IN | DIASTOLIC BLOOD PRESSURE: 68 MMHG | OXYGEN SATURATION: 96 % | HEART RATE: 61 BPM | SYSTOLIC BLOOD PRESSURE: 135 MMHG | BODY MASS INDEX: 36.87 KG/M2 | WEIGHT: 278.22 LBS | RESPIRATION RATE: 18 BRPM

## 2025-03-04 DIAGNOSIS — I35.1 MODERATE AORTIC REGURGITATION: ICD-10-CM

## 2025-03-04 LAB
ANION GAP SERPL CALC-SCNC: 12 MMOL/L (ref 7–16)
BUN SERPL-MCNC: 6 MG/DL (ref 8–22)
CALCIUM SERPL-MCNC: 8.4 MG/DL (ref 8.5–10.5)
CHLORIDE SERPL-SCNC: 107 MMOL/L (ref 96–112)
CO2 SERPL-SCNC: 20 MMOL/L (ref 20–33)
CREAT SERPL-MCNC: 0.7 MG/DL (ref 0.5–1.4)
GFR SERPLBLD CREATININE-BSD FMLA CKD-EPI: 115 ML/MIN/1.73 M 2
GLUCOSE BLD STRIP.AUTO-MCNC: 101 MG/DL (ref 65–99)
GLUCOSE SERPL-MCNC: 102 MG/DL (ref 65–99)
POTASSIUM SERPL-SCNC: 4.3 MMOL/L (ref 3.6–5.5)
SODIUM SERPL-SCNC: 139 MMOL/L (ref 135–145)

## 2025-03-04 PROCEDURE — 160002 HCHG RECOVERY MINUTES (STAT)

## 2025-03-04 PROCEDURE — 160046 HCHG PACU - 1ST 60 MINS PHASE II

## 2025-03-04 PROCEDURE — 700111 HCHG RX REV CODE 636 W/ 250 OVERRIDE (IP): Mod: JZ,UD | Performed by: INTERNAL MEDICINE

## 2025-03-04 PROCEDURE — 99152 MOD SED SAME PHYS/QHP 5/>YRS: CPT

## 2025-03-04 PROCEDURE — 160035 HCHG PACU - 1ST 60 MINS PHASE I

## 2025-03-04 PROCEDURE — 80048 BASIC METABOLIC PNL TOTAL CA: CPT

## 2025-03-04 PROCEDURE — 82962 GLUCOSE BLOOD TEST: CPT

## 2025-03-04 RX ORDER — MIDAZOLAM HYDROCHLORIDE 1 MG/ML
.5-2 INJECTION INTRAMUSCULAR; INTRAVENOUS PRN
Status: DISCONTINUED | OUTPATIENT
Start: 2025-03-04 | End: 2025-03-04 | Stop reason: HOSPADM

## 2025-03-04 RX ORDER — SODIUM CHLORIDE 9 MG/ML
500 INJECTION, SOLUTION INTRAVENOUS
Status: DISCONTINUED | OUTPATIENT
Start: 2025-03-04 | End: 2025-03-04 | Stop reason: HOSPADM

## 2025-03-04 RX ADMIN — MIDAZOLAM HYDROCHLORIDE 2 MG: 1 INJECTION, SOLUTION INTRAMUSCULAR; INTRAVENOUS at 13:37

## 2025-03-04 RX ADMIN — MIDAZOLAM HYDROCHLORIDE 2 MG: 1 INJECTION, SOLUTION INTRAMUSCULAR; INTRAVENOUS at 13:35

## 2025-03-04 RX ADMIN — FENTANYL CITRATE 50 MCG: 50 INJECTION, SOLUTION INTRAMUSCULAR; INTRAVENOUS at 13:42

## 2025-03-04 RX ADMIN — MIDAZOLAM HYDROCHLORIDE 2 MG: 1 INJECTION, SOLUTION INTRAMUSCULAR; INTRAVENOUS at 13:39

## 2025-03-04 RX ADMIN — FENTANYL CITRATE 50 MCG: 50 INJECTION, SOLUTION INTRAMUSCULAR; INTRAVENOUS at 13:35

## 2025-03-04 RX ADMIN — FENTANYL CITRATE 50 MCG: 50 INJECTION, SOLUTION INTRAMUSCULAR; INTRAVENOUS at 13:37

## 2025-03-04 RX ADMIN — FENTANYL CITRATE 50 MCG: 50 INJECTION, SOLUTION INTRAMUSCULAR; INTRAVENOUS at 13:39

## 2025-03-04 RX ADMIN — MIDAZOLAM HYDROCHLORIDE 2 MG: 1 INJECTION, SOLUTION INTRAMUSCULAR; INTRAVENOUS at 13:42

## 2025-03-04 NOTE — DISCHARGE INSTRUCTIONS
What to Expect Post Sedation    Rest and take it easy for the first 24 hours.  A responsible adult is recommended to remain with you during that time.  It is normal to feel sleepy.  We encourage you to not do anything that requires balance, judgment or coordination.    FOR 24 HOURS DO NOT:  Drive, operate machinery or run household appliances.  Drink beer or alcoholic beverages.  Make important decisions or sign legal documents.    To avoid nausea, slowly advance diet as tolerated, avoiding spicy or greasy foods for the first day.  Add more substantial food to your diet according to your provider's instructions.  INCREASE FLUIDS AND FIBER TO AVOID CONSTIPATION.    MILD FLU-LIKE SYMPTOMS ARE NORMAL.  YOU MAY EXPERIENCE GENERALIZED MUSCLE ACHES, THROAT IRRITATION, HEADACHE AND/OR SOME NAUSEA.  If any questions arise, call your provider.  If your provider is not available, please feel free to call the Surgical Center at [phone].    MEDICATIONS: Resume taking daily medication.  Take prescribed pain medication with food.  If no medication is prescribed, you may take non-aspirin pain medication if needed.  PAIN MEDICATION CAN BE VERY CONSTIPATING.  Take a stool softener or laxative such as senokot, pericolace, or milk of magnesia if needed.    Diet    Resume your normal diet as tolerated.  A diet low in cholesterol, fat, and sodium is recommended for good health.     Discharge Instructions:  Discharge Instructions:  Transesophageal Echocardiogram (REY)    REY is a test that uses sound waves to take pictures of your heart. REY is done by passing a small probe attached to a flexible tube down the part of the body that moves food from your mouth to your stomach (esophagus).    The pictures give clear images of your heart. This can help your doctor see if there are problems with your heart.   General instructions    Follow instructions from your doctor about what you cannot eat or drink.   You will take out any  dentures or dental retainers.   Plan to have a responsible adult take you home from the hospital or clinic.   Plan to have a responsible adult care for you for the time you are told after you leave the hospital or clinic. This is important.  What can I expect after the procedure?    You will be monitored until you leave the hospital or clinic. This includes checking your blood pressure, heart rate, breathing rate, and blood oxygen level.   Your throat may feel sore and numb. This will get better over time. You will not be allowed to eat or drink until the numbness has gone away.   It is common to have a sore throat for a day or two.   It is up to you to get the results of your procedure. Ask how to get your results when they are ready.   Pink tinge sputum is common after your procedure  Follow these instructions at home:    If you were given a sedative during your procedure, do not drive or use machines until your doctor says that it is safe.   Return to your normal activities when your doctor says that it is safe.   Keep all follow-up visits.  Go to ER / call 911:   If you cough up bright red blood or vomit blood   If you have severe pain in throat/stomach.   If you have difficulty breathing that does not go away with rest  Summary    REY is a test that uses sound waves to take pictures of your heart.   You will be given a medicine to help you relax.   Pink tinge sputum is common after your procedure     DRESSING: Not applicable    BOWEL FUNCTION:  If you are having problems, use what you normally would or call your provider for suggestions. It also helps to stay regular by including fiber in your diet (for example: bran or fruits and vegetables) and drink plenty of liquids (water, juice, etc.).

## 2025-03-04 NOTE — PROCEDURES
DOS: 3/4/2025    Procedure performed: Transesophageal echocardiogram.    Patient was brought to procedure room 3.    Risks and benefits of transesophageal echocardiogram were explained to patient.  Patient showed good understanding.  Risks including esophageal perforation, death, bleeding, infection were clearly conveyed to patient.  Patient is willing to accept the risks and proceed with procedure.    Indication: to assess for left atrial appendage thrombus before cardioversion in setting of atrial fibrillation.    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    Recommendation: Proceed with cardioversion safely. Continue anticoagulation as directed.    Moderate sedation or Anesthesia service was used for sedation process.    Moderate sedation was used and supervised by me (Radha Odell MD).  I personally evaluated the patient prior to given sedation to make sure patient would be an appropriate candidate for moderate sedation.  I also personally and directly supervised the entire process of moderate sedation (total time as indicated below).    Agents: Fentanyl (200 mcg) and Versed (8 mg) via intravenous injection (please see media tab for details of dosage).    Start time: 13 : 34 AM.    Stop time: 13 :48 AM.    Complications: none.      Electronically Signed by: Radha ODELL MD. [facility].    3/4/2025  1:48 PM

## 2025-03-04 NOTE — OR NURSING
1358 Patient arrived to unit from echo.  Report from RN.  Patient arouses to voice, denies discomfort.  Patient updated on plan of care.  1410 Patient arouses to voice, denies discomfort.  1415 Mom Vee called and updated on patient status.  1435 Patient tolerating oral intake.  1525 Patient up to edge of bed, denies discomfort.  All lines and monitors discontinued.  1540 Patient taken down via walking to meet mom Vee.  Reviewed discharge paperwork with pt and mom. Discussed diet, activity, medications, follow up care and worsening symptoms. No questions at this time.  Pt discharged home with mom.

## 2025-03-05 ENCOUNTER — RESULTS FOLLOW-UP (OUTPATIENT)
Dept: CARDIOLOGY | Facility: MEDICAL CENTER | Age: 45
End: 2025-03-05

## 2025-03-05 PROCEDURE — 93312 ECHO TRANSESOPHAGEAL: CPT | Mod: 26 | Performed by: INTERNAL MEDICINE

## 2025-03-05 NOTE — RESULT ENCOUNTER NOTE
Dear Ninoska,    Can you please let Seven Blas know that result is not entirely normal and I will need to see patient ASAP to discuss?    Thank you,  Bartolome.

## 2025-03-26 ENCOUNTER — OFFICE VISIT (OUTPATIENT)
Dept: CARDIOLOGY | Facility: MEDICAL CENTER | Age: 45
End: 2025-03-26
Attending: INTERNAL MEDICINE
Payer: MEDICAID

## 2025-03-26 VITALS
OXYGEN SATURATION: 96 % | DIASTOLIC BLOOD PRESSURE: 58 MMHG | HEART RATE: 78 BPM | BODY MASS INDEX: 37.11 KG/M2 | RESPIRATION RATE: 12 BRPM | WEIGHT: 280 LBS | HEIGHT: 73 IN | SYSTOLIC BLOOD PRESSURE: 108 MMHG

## 2025-03-26 DIAGNOSIS — I35.1 MODERATE TO SEVERE AORTIC VALVE REGURGITATION: ICD-10-CM

## 2025-03-26 DIAGNOSIS — I10 PRIMARY HYPERTENSION: ICD-10-CM

## 2025-03-26 DIAGNOSIS — I49.3 PREMATURE VENTRICULAR CONTRACTIONS: ICD-10-CM

## 2025-03-26 LAB — EKG IMPRESSION: NORMAL

## 2025-03-26 PROCEDURE — 99214 OFFICE O/P EST MOD 30 MIN: CPT | Performed by: NURSE PRACTITIONER

## 2025-03-26 PROCEDURE — 3074F SYST BP LT 130 MM HG: CPT | Performed by: NURSE PRACTITIONER

## 2025-03-26 PROCEDURE — 93010 ELECTROCARDIOGRAM REPORT: CPT | Performed by: INTERNAL MEDICINE

## 2025-03-26 PROCEDURE — 93005 ELECTROCARDIOGRAM TRACING: CPT | Mod: TC | Performed by: NURSE PRACTITIONER

## 2025-03-26 PROCEDURE — 3078F DIAST BP <80 MM HG: CPT | Performed by: NURSE PRACTITIONER

## 2025-03-26 PROCEDURE — 99213 OFFICE O/P EST LOW 20 MIN: CPT | Performed by: NURSE PRACTITIONER

## 2025-03-26 NOTE — PROGRESS NOTES
Chief Complaint   Patient presents with    MI (Non ST Segment Elevation MI)     F/V DX: NSTEMI (non-ST elevated myocardial infarction) (HCC)      Hypertension     F/V DX: HTN (hypertension)    Hyperlipidemia     F/V DX: Mixed hyperlipidemia       Subjective     Tu Blas is a 44 y.o. male who presents today for REY follow up.     Patient of Dr. Odell, he was last seen in 2/13/2025.  Patient has additional medical problems of DM2, HTN, hyperlipidemia     Today, Patient feels well, denies chest pain, shortness of breath, palpitations, dizziness/lightheadedness, orthopnea, PND or Edema.     EKG in office today personally interpreted by me as sinus rhythm changes.    We discussed REY showing moderate to severe aortic regurgitation.  Will further evaluate symptoms with treadmill stress test for functional capacity.  Will obtain follow-up echo in 6 months.  Patient to follow-up with Dr. Odell in 6 months.  Past Medical History:   Diagnosis Date    Asthma     Asthma     uses albuterol inh    Diabetes (HCC)     High cholesterol     Hypertension     Infectious disease     oropharyngeal herpes    Myocardial infarct ([facility]) 2016    Snoring 02/28/2025    No sleep study.    Type II or unspecified type diabetes mellitus without mention of complication, not stated as uncontrolled 02/28/2025    TAKES INSULIN     Past Surgical History:   Procedure Laterality Date    SPLIT THICKNESS SKIN GRAFT Left 7/13/2015    Procedure: SPLIT THICKNESS SKIN GRAFT LEG WITH WOUND VAC CHANGE;  Surgeon: Tahir Blackwell M.D.;  Location: [facility];  Service:     SPLIT THICKNESS SKIN GRAFT Left 7/10/2015    Procedure: SPLIT THICKNESS SKIN GRAFT LEG WITH WOUND VAC CHANGE;  Surgeon: Tahir Blackwell M.D.;  Location: SURGERY [facility];  Service:     INCISION AND DRAINAGE ORTHOPEDIC Left 7/8/2015    Procedure: INCISION AND DRAINAGE ORTHOPEDIC;  Surgeon: Tahir Blackwell M.D.;  Location: SURGERY [facility];  Service:   "    History reviewed. No pertinent family history.  Social History     Socioeconomic History    Marital status: Single     Spouse name: Not on file    Number of children: Not on file    Years of education: Not on file    Highest education level: Not on file   Occupational History    Not on file   Tobacco Use    Smoking status: Every Day     Current packs/day: 0.25     Types: Cigarettes    Smokeless tobacco: Never    Tobacco comments:     2/28/2025 - Smokes \"once in awhile\".   Vaping Use    Vaping status: Never Used   Substance and Sexual Activity    Alcohol use: Yes     Comment: Occ    Drug use: No    Sexual activity: Not on file   Other Topics Concern    Not on file   Social History Narrative    ** Merged History Encounter **          Social Drivers of Health     Financial Resource Strain: Not on file   Food Insecurity: Not on file   Transportation Needs: Not on file   Physical Activity: Not on file   Stress: Not on file   Social Connections: Not on file   Intimate Partner Violence: Not on file   Housing Stability: Not on file     No Known Allergies  Outpatient Encounter Medications as of 3/26/2025   Medication Sig Dispense Refill    carvedilol (COREG) 12.5 MG Tab Take 1 Tablet by mouth 2 times a day with meals. 180 Tablet 4    albuterol 108 (90 BASE) MCG/ACT Aero Soln inhalation aerosol Inhale 2 Puffs by mouth every 6 hours as needed for Shortness of Breath. 8.5 g 3    insulin NPH (HUMULIN,NOVOLIN) 100 UNIT/ML Suspension Inject 30 Units as instructed 2 Times a Day. 10 mL 3    insulin 70/30 (HUMULIN,NOVOLIN) (70-30) 100 UNIT/ML Suspension Inject 30 Units as instructed 2 Times a Day. 10 mL 3    atorvastatin (LIPITOR) 40 MG Tab Take 1 Tab by mouth every bedtime. 30 Tab 2    ONETOUCH DELICA LANCETS FINE Misc 1 Each by Does not apply route 3 times a day before meals. 100 Each 3    INSULIN SYRINGE .5CC/29G 29G X 1/2\" 0.5 ML Misc 1 Each by Does not apply route 3 times a day before meals. 100 Each 3    glucose blood strip " "1 Strip by Other route as needed. 100 Strip 3    Insulin Pen Needle 32G X 4 MM Misc 1 Each by Does not apply route 3 times a day before meals. 100 Each 3    lisinopril (PRINIVIL) 20 MG TABS Take 1 Tab by mouth every day. 30 Tab 0    aspirin 81 MG tablet Take 1 Tab by mouth every day. (Patient not taking: Reported on 2/28/2025)      lisinopril (PRINIVIL) 10 MG Tab Take 1 Tab by mouth every day. (Patient not taking: Reported on 3/26/2025) 30 Tab 2     No facility-administered encounter medications on file as of 3/26/2025.     ROS Complete review of systems negative except as noted in HPI/subjective           Objective     /58 (BP Location: Left arm, Patient Position: Sitting, BP Cuff Size: Adult)   Pulse 78   Resp 12   Ht 1.854 m (6' 1\")   Wt (!) 127 kg (280 lb)   SpO2 96%   BMI 36.94 kg/m²     Physical Exam  Vitals reviewed.   Constitutional:       Appearance: He is well-developed.   HENT:      Head: Normocephalic and atraumatic.   Eyes:      Pupils: Pupils are equal, round, and reactive to light.   Neck:      Vascular: No JVD.   Cardiovascular:      Rate and Rhythm: Normal rate and regular rhythm.      Heart sounds: Murmur heard.      Systolic murmur is present.      No friction rub. No gallop.   Pulmonary:      Effort: Pulmonary effort is normal. No respiratory distress.      Breath sounds: Normal breath sounds.   Abdominal:      General: Bowel sounds are normal. There is no distension.      Palpations: Abdomen is soft.   Skin:     General: Skin is warm and dry.      Findings: No erythema.   Neurological:      Mental Status: He is alert and oriented to person, place, and time.   Psychiatric:         Behavior: Behavior normal.            Lab Results   Component Value Date/Time    CHOLSTRLTOT 127 11/15/2024 12:00 AM    LDL 57 11/15/2024 12:00 AM    HDL 52 11/15/2024 12:00 AM    TRIGLYCERIDE 98 11/15/2024 12:00 AM       Lab Results   Component Value Date/Time    SODIUM 139 03/04/2025 11:40 AM    POTASSIUM " 4.3 03/04/2025 11:40 AM    CHLORIDE 107 03/04/2025 11:40 AM    CO2 20 03/04/2025 11:40 AM    GLUCOSE 102 (H) 03/04/2025 11:40 AM    BUN 6 (L) 03/04/2025 11:40 AM    CREATININE 0.70 03/04/2025 11:40 AM     Lab Results   Component Value Date/Time    ALKPHOSPHAT 67 07/11/2021 07:43 AM    ASTSGOT 27 07/11/2021 07:43 AM    ALTSGPT 21 07/11/2021 07:43 AM    TBILIRUBIN 0.5 07/11/2021 07:43 AM      TTE (11/29/2024):  Compared to the prior study on 12/08/2016, no changes.   Normal left ventricular systolic function.   Moderate aortic insufficiency.    REY (3/4/2025):  Modeate to severe aortic regurgitation with possible healed prior   vegeation. No aortic valve stenosis. Tricuspid aortic valve.    Assessment & Plan     1. Premature ventricular contractions  EKG    NM-CARDIAC TREADMILL ONLY-NO IMAGING    EC-ECHOCARDIOGRAM COMPLETE W/O CONT    CANCELED: NM-CARDIAC TREADMILL ONLY-NO IMAGING      2. Moderate to severe aortic valve regurgitation  NM-CARDIAC TREADMILL ONLY-NO IMAGING    EC-ECHOCARDIOGRAM COMPLETE W/O CONT    CANCELED: NM-CARDIAC TREADMILL ONLY-NO IMAGING      3. Primary hypertension  EC-ECHOCARDIOGRAM COMPLETE W/O CONT          Medical Decision Making: Today's Assessment/Status/Plan:        Moderate to severe aortic regurgitation  -currently asymptomatic  -treadmill stress test  -FU echo in 6 months    Hypertension  -Today in office blood pressure is well controlled   -continue coreg and lisinopril    Hyperlipidemia  -LDL 57 (11/15/2024)  -continue asa and statin    FU in clinic in 6 months with Dr. Odell. Sooner if needed.    Patient verbalizes understanding and agrees with the plan of care.       AMENA Millan, CCK, HF-Cert   [facility]  [phone]    PLEASE NOTE: This Note was created using voice recognition Software. I have made every reasonable attempt to correct obvious errors, but I expect that there are errors of grammar and possibly content that I did not  discover before finalizing the note

## 2025-04-16 ENCOUNTER — RESULTS FOLLOW-UP (OUTPATIENT)
Dept: CARDIOLOGY | Facility: MEDICAL CENTER | Age: 45
End: 2025-04-16

## 2025-04-16 ENCOUNTER — HOSPITAL ENCOUNTER (OUTPATIENT)
Dept: RADIOLOGY | Facility: MEDICAL CENTER | Age: 45
End: 2025-04-16
Attending: INTERNAL MEDICINE
Payer: MEDICAID

## 2025-04-16 DIAGNOSIS — I35.1 MODERATE TO SEVERE AORTIC VALVE REGURGITATION: ICD-10-CM

## 2025-04-16 DIAGNOSIS — I49.3 PREMATURE VENTRICULAR CONTRACTIONS: ICD-10-CM

## 2025-04-16 PROCEDURE — 93017 CV STRESS TEST TRACING ONLY: CPT | Mod: TC

## 2025-04-16 PROCEDURE — 93018 CV STRESS TEST I&R ONLY: CPT | Performed by: INTERNAL MEDICINE

## 2025-07-29 ENCOUNTER — ANCILLARY PROCEDURE (OUTPATIENT)
Facility: MEDICAL CENTER | Age: 45
End: 2025-07-29
Attending: INTERNAL MEDICINE
Payer: MEDICAID

## 2025-07-30 ENCOUNTER — RESULTS FOLLOW-UP (OUTPATIENT)
Dept: CARDIOLOGY | Facility: MEDICAL CENTER | Age: 45
End: 2025-07-30